# Patient Record
Sex: FEMALE | Race: WHITE
[De-identification: names, ages, dates, MRNs, and addresses within clinical notes are randomized per-mention and may not be internally consistent; named-entity substitution may affect disease eponyms.]

---

## 2019-02-11 ENCOUNTER — HOSPITAL ENCOUNTER (OUTPATIENT)
Dept: HOSPITAL 46 - DS | Age: 64
Discharge: HOME | End: 2019-02-11
Attending: SURGERY
Payer: COMMERCIAL

## 2019-02-11 VITALS — BODY MASS INDEX: 27.05 KG/M2 | WEIGHT: 147 LBS | HEIGHT: 62 IN

## 2019-02-11 DIAGNOSIS — Z79.899: ICD-10-CM

## 2019-02-11 DIAGNOSIS — D12.3: ICD-10-CM

## 2019-02-11 DIAGNOSIS — Z88.5: ICD-10-CM

## 2019-02-11 DIAGNOSIS — I10: ICD-10-CM

## 2019-02-11 DIAGNOSIS — Z12.11: Primary | ICD-10-CM

## 2019-02-11 DIAGNOSIS — Z88.8: ICD-10-CM

## 2019-02-11 PROCEDURE — 0DBL8ZZ EXCISION OF TRANSVERSE COLON, VIA NATURAL OR ARTIFICIAL OPENING ENDOSCOPIC: ICD-10-PCS | Performed by: SURGERY

## 2019-02-11 PROCEDURE — G0500 MOD SEDAT ENDO SERVICE >5YRS: HCPCS

## 2019-02-11 NOTE — NUR
02/11/19 0907 Connie Astorga 0808 PT ARRIVED IN PACU AWAKE WITH NO C/O'S. 0845 OXYGEN REMOVED.
SATS 97% ON RA. SITTING UP IN BED SIPPING ON JUICE. 0850 DC
INSTRUCTIONS GIVEN. PT GETTING DRESSED. 0900 DC. TO DS TO WAIT FOR
RIDE.

## 2019-02-12 NOTE — OR
[facility]
                                    [address] [place], [place]  [address]
_________________________________________________________________________________________
                                                                 Signed   
 
 
DATE OF OPERATION:
02/11/2019
 
SURGEON:
Cj Amin MD
 
PREOPERATIVE DIAGNOSIS:
Colon screening.
 
POSTOPERATIVE DIAGNOSIS:
Pedunculated polyp at the left transverse colon/splenic flexure area.
 
PROCEDURE:
Total colonoscopy to cecum with hot snare polypectomy x1.
 
ANESTHESIA:
Intravenous sedation, fentanyl 100 mcg, Versed 6 mg.
 
INDICATION:
This 63-year-old white woman is a patient of Dr. Herrera and ANAT Torres.  She
underwent colonoscopy greater than 10 years ago in [place].  She is here for
colonoscopy at this time.  She has no family history of colon cancer and no symptoms of
bleeding, diarrhea, or constipation.  She understands the risks of bleeding, infection,
and perforation related to colonoscopy and wished to proceed. 
 
FINDINGS:
The prep was excellent.  Complete colonoscopy was undertaken to the cecum without
question.  She had a pedunculated polyp in the left transverse colon, which was excised
with hot snare polypectomy technique.  There were no other findings of concern. 
 
DESCRIPTION OF PROCEDURE:
The patient was brought to the endoscopy suite and placed in lateral decubitus position
given intravenous sedation to the point of slurred speech and nystagmus.  Digital rectal
examination was normal. 
 
An Olympus video colonoscope was passed in the rectum and manipulated throughout the
colon ultimately intubating the cecum itself.  The ileocecal valve and appendiceal
orifice were normal.  The scope was withdrawn from that point and careful examination
upon withdrawal of scope was undertaken.  In the left transverse colon was a
pedunculated obviously adenomatous polyp about a centimeter in size.  This was excised
with hot snare polypectomy technique with the usual technique.  A basket was used to
grasp the polyp.  Strangely, disengagement of the polyp from the basket occurred several
 
    Electronically Signed By: CJ AMIN MD  02/12/19 1531
_________________________________________________________________________________________
PATIENT NAME:     LIMA FRASER                    
MEDICAL RECORD #: [id number]            OPERATIVE REPORT              
          ACCT #: [id number]  
DATE OF BIRTH:   [birth year]            REPORT #: [id number]      
PHYSICIAN:        CJ AMIN MD                 
PCP:              CAILIN DELGADILLO               
REPORT IS CONFIDENTIAL AND NOT TO BE RELEASED WITHOUT AUTHORIZATION
 
 
                                  [facility]
                                    [address] [place] [address]  [id number]
_________________________________________________________________________________________
                                                                 Signed   
 
 
times.  Therefore, the snare was changed and polyp regrasped.  At that point, it was
somewhat shredded, but was ultimately extracted.  The remaining colon was found to be
normal.  Retroflexed view of the rectum was normal as well.  The scope was removed and
the patient was taken to recovery room in good condition. 
 
CONCLUDING DIAGNOSIS:
Polyp of the left transverse colon.
 
PLAN:
Recommend repeat colonoscopy in 3 years sooner if clinically indicated.  She will return
to the ongoing care of Dr. Herrera and ANAT Torres. 
 
 
 
            ________________________________________
            MD DILCIA Jon/BRENDA
Job #:  [id number]
DD:  02/11/2019 08:29:33
DT:  02/11/2019 12:29:20
 
cc:            MD Cailin Solano PA-C
 
 
Copies:  BIJAN HERRERA MD, ERIKA PAC
~
 
 
 
 
 
 
 
 
 
 
 
 
    Electronically Signed By: CJ AMIN MD  02/12/19 1531
_________________________________________________________________________________________
PATIENT NAME:     LIMA FRASER                    
MEDICAL RECORD #: [id number]            OPERATIVE REPORT              
          ACCT #: [id number]  
DATE OF BIRTH:   [birth year]            REPORT #: [id number]      
PHYSICIAN:        CJ AMIN MD                 
PCP:              CAILIN DELGADILLO PAC               
REPORT IS CONFIDENTIAL AND NOT TO BE RELEASED WITHOUT AUTHORIZATION

## 2022-04-11 NOTE — NUR
04/11/22 0916 Carmella Andrews
0912- PT ARRIVES TO PACU AROUSABLE TO VOICE. PT FALLS INSTANTLY BACK
TO SLEEP WHEN NOT BEING TALKED TO. RESP EVEN AND UNLABORED. OXYGEN SAT
HIGH 90'S ON 2L VIA NC.

## 2022-04-20 NOTE — OR
[facility]
                                    [address] [place], [place]  [address]
_________________________________________________________________________________________
                                                                 Signed   
 
 
DATE OF OPERATION:
04/11/2022
 
SURGEON:
Cj Amin MD
 
PREOPERATIVE DIAGNOSIS:
History of colonic polyp 2019.
 
POSTOPERATIVE DIAGNOSES:
1. Cecal polyp and sigmoid polyp.
2. Scattered diverticula.
 
PROCEDURE:
Total colonoscopy to cecum with cold morcellation, excision of cecal polyp and cold
snare polypectomy, sigmoid polyp. 
 
ANESTHESIA:
Intravenous sedation; fentanyl 100 mcg and Versed 7 mg.
 
INDICATION:
This 66-year-old white woman is a patient of ANAT Torres as well as Dr. Nena Herrera. She underwent colonoscopy by me in 2019, at which time she had a tubulovillous
adenoma at the splenic flexure.  She has no symptoms of bleeding, diarrhea or
constipation and has no family history of colon cancer.  She is admitted for
surveillance colonoscopy. She understands the risk of bleeding, infection, and
perforation. 
 
FINDINGS:
The prep was good.  Complete colonoscopy was undertaken to the cecum without question.
She had scattered diverticula, but also a small sessile polyp of the cecum, which was
excised with cold morcellation technique and a somewhat larger sessile polyp of the
sigmoid, excised with cold snare polypectomy technique.  There were no other findings of
concern. 
 
DESCRIPTION OF PROCEDURE:
The patient was brought to the endoscopy suite and placed in lateral decubitus position,
given intravenous sedation to the point of slurred speech and nystagmus.  Digital rectal
examination was normal. 
 
An Olympus video colonoscope was passed in the rectum and manipulated throughout the
colon ultimately intubating the cecum itself.  The ileocecal valve and appendiceal
 
    Electronically Signed By: CJ AMIN MD  04/20/22 1226
_________________________________________________________________________________________
PATIENT NAME:     LIMA FRASER                    
MEDICAL RECORD #: [id number]            OPERATIVE REPORT              
          ACCT #: [id number]  
DATE OF BIRTH:   [birth year]            REPORT #: [id number]      
PHYSICIAN:        CJ AMIN MD                 
PCP:              LINO DELGADILLO               
REPORT IS CONFIDENTIAL AND NOT TO BE RELEASED WITHOUT AUTHORIZATION
 
 
                                  [facility]
                                    [address] [place] [address]  [address]
_________________________________________________________________________________________
                                                                 Signed   
 
 
orifice were normal.  A small adenomatous appearing polyp was noted at the cecum, it was
excised with cold morcellation technique.  Photographs were taken.  The scope was
withdrawn and examination showed only scattered diverticula until the mid sigmoid where
a sessile polyp was noted, this was excised with cold snare technique without problem.
The scope was further withdrawn and retroflexed view of the rectum was performed, which
was found to be normal.  Scope was removed and the patient was taken to the recovery
room in good condition. 
 
CONCLUDING DIAGNOSIS:
Polyps x2.
 
PLAN:
Recommend repeat colonoscopy in 3 to 5 years, sooner if clinically indicated.  A
high-fiber diet is recommended. 
 
 
 
            ________________________________________
            MD DILCIA Jon/BRENDA
Job #:  [id number]
DD:  04/11/2022 09:27:15
DT:  04/11/2022 10:01:21
 
cc:            JENNIE Torres MD
 
 
Copies:  LINO DELGADILLO PATRICIA J MD
~
 
 
 
 
 
 
 
 
 
    Electronically Signed By: CJ AMIN MD  04/20/22 1226
_________________________________________________________________________________________
PATIENT NAME:     LIMA FRASER                    
MEDICAL RECORD #: [id number]            OPERATIVE REPORT              
          ACCT #: [id number]  
DATE OF BIRTH:   [birth year]            REPORT #: [id number]      
PHYSICIAN:        CJ AMIN MD                 
PCP:              LINO DELGADILLO PAC               
REPORT IS CONFIDENTIAL AND NOT TO BE RELEASED WITHOUT AUTHORIZATION

## 2025-01-09 NOTE — EKG
[facility]
                                    [address] [place]  [address]
_________________________________________________________________________________________
                                                                 Signed   
 
 
Sinus tachycardia
Inferior infarct , age undetermined
Cannot rule out Anterior infarct , age undetermined
Abnormal ECG
No previous ECGs available
Confirmed by Kelsea Lozoya MD ([id number]) on 1/9/2025 10:14:38 PM
 
 
 
 
 
 
 
 
 
 
 
 
 
 
 
 
 
 
 
 
 
 
 
 
 
 
 
 
 
 
 
 
 
 
 
 
 
 
 
    Electronically Signed By: KELSEA LOZOYA MD  01/09/25 2214
_________________________________________________________________________________________
PATIENT NAME:     LIMA FRASER                    
MEDICAL RECORD #: [id number]                     Electrocardiogram             
          ACCT #: [id number]  
DATE OF BIRTH:   [birth year]                                       
PHYSICIAN:   KELSEA LOZOYA MD                 REPORT #: [id number]
REPORT IS CONFIDENTIAL AND NOT TO BE RELEASED WITHOUT AUTHORIZATION